# Patient Record
Sex: FEMALE | Race: WHITE | NOT HISPANIC OR LATINO | URBAN - NONMETROPOLITAN AREA
[De-identification: names, ages, dates, MRNs, and addresses within clinical notes are randomized per-mention and may not be internally consistent; named-entity substitution may affect disease eponyms.]

---

## 2018-11-16 PROBLEM — Z96.1: Noted: 2018-11-16

## 2018-11-16 PROBLEM — H52.13: Noted: 2018-11-16

## 2018-11-16 PROBLEM — H52.4: Noted: 2018-11-16

## 2018-11-16 PROBLEM — H18.832: Noted: 2018-11-16

## 2019-01-14 ENCOUNTER — IMPORTED ENCOUNTER (OUTPATIENT)
Dept: URBAN - NONMETROPOLITAN AREA CLINIC 1 | Facility: CLINIC | Age: 59
End: 2019-01-14

## 2019-01-14 PROCEDURE — 92015 DETERMINE REFRACTIVE STATE: CPT

## 2019-01-14 NOTE — PATIENT DISCUSSION
Refraction only- Discussed diagnosis in detail with patient - MR done and new glasses Rx given- Continue to monitor -----------------------previous notes---------------------Myopia OD / Presbyopia OU- Discussed diagnosis in detail with patient -  Recommended that patient return soon for MR only (OCT of mac also if MR shows no improvement in [place]). - Patient agreed with plan- Contnue to monitorPseudophakia OU- Discussed diagnosis in detail with patient- Patient is stable and doing well - Continue to monitorHx of Recurrent corneal erosion with Corneal Scar OS: - Discussed diagnosis in detail with patient- Hx of [facility] and [facility] use.  - Continue AT's PRN- Monitor PRN changes; 's Notes:  9/29/16

## 2022-04-09 ASSESSMENT — VISUAL ACUITY
OD_SC: 20/22+1
OS_SC: 20/29+2